# Patient Record
Sex: FEMALE | Race: OTHER | HISPANIC OR LATINO
[De-identification: names, ages, dates, MRNs, and addresses within clinical notes are randomized per-mention and may not be internally consistent; named-entity substitution may affect disease eponyms.]

---
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
## 2023-07-23 ENCOUNTER — NON-APPOINTMENT (OUTPATIENT)
Age: 18
End: 2023-07-23

## 2023-07-24 NOTE — BRIEF OPERATIVE NOTE - VENOUS THROMBOEMBOLISM PROPHYLAXIS THERAPY
Discharge Planning Assessment Post Partum    Reason for Referral: NICU Twins  Address: [facility] [address] [place]   Type of Living Situation:Stable   Mom Diagnosis: Postpartum  Baby Diagnosis: NICU 28.3  Primary Language: English     Name of Baby: Garo (A) Alise (B)  Father of the Baby: Min Garza   Involved in baby’s care? Yes  Contact Information: [phone]    Prenatal Care: Yes  Mom's PCP: None  PCP for new baby:List given     Support System: Yes  Coping/Bonding between mother & baby: MOB coping/bonding   Source of Feeding: Breast  Supplies for Infant: Yes    Mom's Insurance: [place]  Baby Covered on Insurance:Yes  Mother Employed/School: Self   Other children in the home/names & ages: 2 yr old and 1 yr old     Financial Hardship/Income: None   Mom's Mental status: Stable   Services used prior to admit: None    CPS History: None  Psychiatric History: None  Domestic Violence History: None  Drug/ETOH History: None    Resources Provided:  provided pediatrician list and discussed Collin Cavanaugh   Referrals Made: Collin Cavanaugh      Clearance for Discharge: Babies are cleared to discharge with MOB and FOB when medically cleared      Ongoing Plan: will continue to follow and provide support   SCDs

## 2023-08-17 NOTE — OB RN PATIENT PROFILE - HEALTH/HEALTHCARE ANXIETIES, PROFILE
Spoke with patient and .  Offered 9/13 surgery date pending surgeon and [facility] approval.  Will schedule all appts on 9/1.    Pt's  had questions about type of implant.  Attempted to explain program.  states he will speak with surgeon at visit on 9/1.   denies

## 2023-08-31 ENCOUNTER — NON-APPOINTMENT (OUTPATIENT)
Age: 18
End: 2023-08-31

## 2023-12-07 ENCOUNTER — NON-APPOINTMENT (OUTPATIENT)
Age: 18
End: 2023-12-07
